# Patient Record
(demographics unavailable — no encounter records)

---

## 2024-10-31 NOTE — HISTORY OF PRESENT ILLNESS
[FreeTextEntry1] : DEREK SIEGEL is a 35 year M presenting on 10/31/2024 PMH: decreased penile sensation  Decreased penile sensation.  Persistent numbness in entire groin. More recently feeling pressure in thighs when sitting. Feels like muscles sanjiv.  Feels "ball" sensation/pressure in perineum when sitting. No ED. Difficulty with orgasm due to numbness. Previous pain, now resolved. Negative neuro/MRI workup. 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  , 9/2024 PDUS: 7/10 on 5u trimix, no venous leak

## 2024-10-31 NOTE — ASSESSMENT
[FreeTextEntry1] : 34yo M CPPS penile numbness -UA, Ucx -sitz baths -consider alfuzosin in 6 weeks by phone if still needed -consider PFPT -F/U 3 months

## 2024-10-31 NOTE — PHYSICAL EXAM
[Normal Appearance] : normal appearance [Well Groomed] : well groomed [General Appearance - In No Acute Distress] : no acute distress [Respiration, Rhythm And Depth] : normal respiratory rhythm and effort [Exaggerated Use Of Accessory Muscles For Inspiration] : no accessory muscle use [Urethral Meatus] : meatus normal [Penis Abnormality] : normal circumcised penis [Scrotum] : the scrotum was normal [Testes Tenderness] : no tenderness of the testes [Testes Mass (___cm)] : there were no testicular masses [Normal Station and Gait] : the gait and station were normal for the patient's age [] : no rash [No Focal Deficits] : no focal deficits [Oriented To Time, Place, And Person] : oriented to person, place, and time [Affect] : the affect was normal [Mood] : the mood was normal [Chaperone Declined] : Patient declined chaperone [de-identified] : B/L 18cc testes, no masses

## 2024-10-31 NOTE — END OF VISIT
[FreeTextEntry3] : I, Dr. Acevedo, personally performed the evaluation and management (E/M) services for this established patient who presents today with (a) new problem(s)/exacerbation of (an) existing condition(s). That E/M includes conducting the clinically appropriate interval history &/or exam, assessing all new/exacerbated conditions, and establishing a new plan of care. Today, my FRANKO, Orniceptsunday Saleemins, was here to observe my evaluation and management service for this new problem/exacerbated condition and follow the plan of care established by me going forward

## 2024-10-31 NOTE — ASSESSMENT
[FreeTextEntry1] : 36yo M CPPS penile numbness -UA, Ucx -sitz baths -consider alfuzosin in 6 weeks by phone if still needed -consider PFPT -F/U 3 months

## 2024-10-31 NOTE — PHYSICAL EXAM
[Normal Appearance] : normal appearance [Well Groomed] : well groomed [General Appearance - In No Acute Distress] : no acute distress [Respiration, Rhythm And Depth] : normal respiratory rhythm and effort [Exaggerated Use Of Accessory Muscles For Inspiration] : no accessory muscle use [Urethral Meatus] : meatus normal [Penis Abnormality] : normal circumcised penis [Scrotum] : the scrotum was normal [Testes Tenderness] : no tenderness of the testes [Testes Mass (___cm)] : there were no testicular masses [Normal Station and Gait] : the gait and station were normal for the patient's age [] : no rash [No Focal Deficits] : no focal deficits [Oriented To Time, Place, And Person] : oriented to person, place, and time [Affect] : the affect was normal [Mood] : the mood was normal [Chaperone Declined] : Patient declined chaperone [de-identified] : B/L 18cc testes, no masses

## 2024-10-31 NOTE — END OF VISIT
[FreeTextEntry3] : I, Dr. Acevedo, personally performed the evaluation and management (E/M) services for this established patient who presents today with (a) new problem(s)/exacerbation of (an) existing condition(s). That E/M includes conducting the clinically appropriate interval history &/or exam, assessing all new/exacerbated conditions, and establishing a new plan of care. Today, my FRANKO, Roundratesunday Saleemins, was here to observe my evaluation and management service for this new problem/exacerbated condition and follow the plan of care established by me going forward
